# Patient Record
Sex: MALE | Race: OTHER | NOT HISPANIC OR LATINO | URBAN - METROPOLITAN AREA
[De-identification: names, ages, dates, MRNs, and addresses within clinical notes are randomized per-mention and may not be internally consistent; named-entity substitution may affect disease eponyms.]

---

## 2023-05-16 ENCOUNTER — EMERGENCY (EMERGENCY)
Facility: HOSPITAL | Age: 43
LOS: 1 days | Discharge: HOME | End: 2023-05-16
Attending: STUDENT IN AN ORGANIZED HEALTH CARE EDUCATION/TRAINING PROGRAM | Admitting: STUDENT IN AN ORGANIZED HEALTH CARE EDUCATION/TRAINING PROGRAM
Payer: COMMERCIAL

## 2023-05-16 VITALS
SYSTOLIC BLOOD PRESSURE: 138 MMHG | TEMPERATURE: 98 F | RESPIRATION RATE: 18 BRPM | OXYGEN SATURATION: 100 % | HEART RATE: 76 BPM | DIASTOLIC BLOOD PRESSURE: 85 MMHG

## 2023-05-16 PROCEDURE — 99284 EMERGENCY DEPT VISIT MOD MDM: CPT

## 2023-05-16 RX ORDER — LIDOCAINE 4 G/100G
1 CREAM TOPICAL
Qty: 2 | Refills: 0
Start: 2023-05-16 | End: 2023-05-25

## 2023-05-16 RX ORDER — KETOROLAC TROMETHAMINE 30 MG/ML
30 SYRINGE (ML) INJECTION ONCE
Refills: 0 | Status: DISCONTINUED | OUTPATIENT
Start: 2023-05-16 | End: 2023-05-16

## 2023-05-16 RX ORDER — IBUPROFEN 200 MG
1 TABLET ORAL
Qty: 20 | Refills: 0
Start: 2023-05-16 | End: 2023-05-25

## 2023-05-16 RX ORDER — LIDOCAINE 4 G/100G
1 CREAM TOPICAL ONCE
Refills: 0 | Status: COMPLETED | OUTPATIENT
Start: 2023-05-16 | End: 2023-05-16

## 2023-05-16 RX ORDER — ACETAMINOPHEN 500 MG
650 TABLET ORAL ONCE
Refills: 0 | Status: COMPLETED | OUTPATIENT
Start: 2023-05-16 | End: 2023-05-16

## 2023-05-16 RX ADMIN — LIDOCAINE 1 PATCH: 4 CREAM TOPICAL at 12:18

## 2023-05-16 RX ADMIN — Medication 650 MILLIGRAM(S): at 12:18

## 2023-05-16 NOTE — ED PROVIDER NOTE - PHYSICAL EXAMINATION
Afebrile, well appearing, rrr, ctabl, stable gait, no le edema, no midline c/t/l spinal ttp or stepoffs  Stable gait. SILT over S/S/T/DP/SP b/l, palp DP pulses b/l

## 2023-05-16 NOTE — ED ADULT NURSE NOTE - OBJECTIVE STATEMENT
pt to ED c/o L lower back pain starting yesterday after heavy lifting at work. denies any numbness/tingling, difficulty voiding. +distal pulses, +sensation/+ROM in distal extremities. no acute respiratory distress at this time. medicated as ordered per EMR with no acute adverse reactions. pending dispo.

## 2023-05-16 NOTE — ED PROVIDER NOTE - OBJECTIVE STATEMENT
43M, no sig pmh, who presents with back pain. Works in long-term services. Was lifting a large box yesterday when he pulled the L side of his back. Pain is positional, non-radiating. Stooling/voiding without any issues. No saddle anesthesia. No bowel/bladder incontinence. No urine retention. No IVDU. Not on blood thinners. No hx of malignancy. No hx of back surgeries. No constitutional sxs. On ROS, denies headaches, fevers, chills, cough, sputum, cp, sob, abdominal pain, nvd, dysuria, hematuria, recent travel, syncope, black/bloody stools.

## 2023-05-16 NOTE — ED PROVIDER NOTE - PATIENT PORTAL LINK FT
You can access the FollowMyHealth Patient Portal offered by [facility] by registering at the following website: [email or web address]. By joining [facility]’s FollowMyHealth portal, you will also be able to view your health information using other applications (apps) compatible with our system.

## 2023-05-16 NOTE — ED PROVIDER NOTE - CLINICAL SUMMARY MEDICAL DECISION MAKING FREE TEXT BOX
43M, no pmh, who presents with back pain after heavy lifting. Afebrile, well appearing. No back pain red flags on hx/physical. Neurologically intact here. Stooling/voiding at baseline. Ambulatory. Will provide analgesia and reassess.

## 2023-05-16 NOTE — ED ADULT NURSE NOTE - NSFALLUNIVINTERV_ED_ALL_ED
Bed/Stretcher in lowest position, wheels locked, appropriate side rails in place/Call bell, personal items and telephone in reach/Instruct patient to call for assistance before getting out of bed/chair/stretcher/Non-slip footwear applied when patient is off stretcher/[place] to call system/Physically safe environment - no spills, clutter or unnecessary equipment/Purposeful proactive rounding/Room/bathroom lighting operational, light cord in reach